# Patient Record
Sex: FEMALE | NOT HISPANIC OR LATINO
[De-identification: names, ages, dates, MRNs, and addresses within clinical notes are randomized per-mention and may not be internally consistent; named-entity substitution may affect disease eponyms.]

---

## 2018-03-12 ENCOUNTER — TRANSCRIPTION ENCOUNTER (OUTPATIENT)
Age: 23
End: 2018-03-12

## 2018-03-16 ENCOUNTER — TRANSCRIPTION ENCOUNTER (OUTPATIENT)
Age: 23
End: 2018-03-16

## 2018-05-08 ENCOUNTER — RESULT REVIEW (OUTPATIENT)
Age: 23
End: 2018-05-08

## 2018-12-17 PROBLEM — Z00.00 ENCOUNTER FOR PREVENTIVE HEALTH EXAMINATION: Status: ACTIVE | Noted: 2018-12-17

## 2018-12-18 ENCOUNTER — RECORD ABSTRACTING (OUTPATIENT)
Age: 23
End: 2018-12-18

## 2018-12-18 DIAGNOSIS — Z80.1 FAMILY HISTORY OF MALIGNANT NEOPLASM OF TRACHEA, BRONCHUS AND LUNG: ICD-10-CM

## 2018-12-18 DIAGNOSIS — Z82.62 FAMILY HISTORY OF OSTEOPOROSIS: ICD-10-CM

## 2018-12-18 DIAGNOSIS — D68.9 COAGULATION DEFECT, UNSPECIFIED: ICD-10-CM

## 2018-12-18 DIAGNOSIS — N94.6 DYSMENORRHEA, UNSPECIFIED: ICD-10-CM

## 2018-12-18 LAB — CYTOLOGY CVX/VAG DOC THIN PREP: NORMAL

## 2018-12-18 RX ORDER — INSULIN ASPART 100 [IU]/ML
100 INJECTION, SOLUTION INTRAVENOUS; SUBCUTANEOUS
Refills: 0 | Status: ACTIVE | COMMUNITY

## 2018-12-28 ENCOUNTER — OTHER (OUTPATIENT)
Age: 23
End: 2018-12-28

## 2019-01-02 ENCOUNTER — APPOINTMENT (OUTPATIENT)
Dept: OBGYN | Facility: CLINIC | Age: 24
End: 2019-01-02

## 2019-01-16 ENCOUNTER — APPOINTMENT (OUTPATIENT)
Dept: OBGYN | Facility: CLINIC | Age: 24
End: 2019-01-16
Payer: COMMERCIAL

## 2019-01-16 VITALS
WEIGHT: 133 LBS | BODY MASS INDEX: 25.11 KG/M2 | DIASTOLIC BLOOD PRESSURE: 64 MMHG | HEIGHT: 61 IN | SYSTOLIC BLOOD PRESSURE: 110 MMHG

## 2019-01-16 DIAGNOSIS — N83.201 UNSPECIFIED OVARIAN CYST, RIGHT SIDE: ICD-10-CM

## 2019-01-16 LAB
BILIRUB UR QL STRIP: NORMAL
COLLECTION METHOD: NORMAL
GLUCOSE UR-MCNC: NORMAL
HCG UR QL: 0.2 EU/DL
HCG UR QL: NEGATIVE
HGB UR QL STRIP.AUTO: ABNORMAL
KETONES UR-MCNC: ABNORMAL
LEUKOCYTE ESTERASE UR QL STRIP: NORMAL
NITRITE UR QL STRIP: NORMAL
PH UR STRIP: 7
PROT UR STRIP-MCNC: NORMAL
QUALITY CONTROL: YES
SP GR UR STRIP: 1.01

## 2019-01-16 PROCEDURE — 81025 URINE PREGNANCY TEST: CPT

## 2019-01-16 PROCEDURE — 76830 TRANSVAGINAL US NON-OB: CPT

## 2019-01-16 PROCEDURE — 99213 OFFICE O/P EST LOW 20 MIN: CPT | Mod: 25

## 2019-01-16 PROCEDURE — 81003 URINALYSIS AUTO W/O SCOPE: CPT | Mod: QW

## 2019-01-31 ENCOUNTER — TRANSCRIPTION ENCOUNTER (OUTPATIENT)
Age: 24
End: 2019-01-31

## 2019-03-13 ENCOUNTER — RX RENEWAL (OUTPATIENT)
Age: 24
End: 2019-03-13

## 2019-05-15 ENCOUNTER — APPOINTMENT (OUTPATIENT)
Dept: OBGYN | Facility: CLINIC | Age: 24
End: 2019-05-15
Payer: COMMERCIAL

## 2019-05-15 VITALS
WEIGHT: 132 LBS | HEIGHT: 61 IN | BODY MASS INDEX: 24.92 KG/M2 | SYSTOLIC BLOOD PRESSURE: 102 MMHG | DIASTOLIC BLOOD PRESSURE: 58 MMHG

## 2019-05-15 LAB
BILIRUB UR QL STRIP: NORMAL
CLARITY UR: CLEAR
COLLECTION METHOD: NORMAL
GLUCOSE UR-MCNC: NORMAL
HCG UR QL: 0.2 EU/DL
HCG UR QL: NEGATIVE
HGB UR QL STRIP.AUTO: NORMAL
KETONES UR-MCNC: NORMAL
LEUKOCYTE ESTERASE UR QL STRIP: ABNORMAL
NITRITE UR QL STRIP: NORMAL
PH UR STRIP: 7
PROT UR STRIP-MCNC: NORMAL
QUALITY CONTROL: YES
SP GR UR STRIP: 1.01

## 2019-05-15 PROCEDURE — 99213 OFFICE O/P EST LOW 20 MIN: CPT

## 2019-05-15 PROCEDURE — 81003 URINALYSIS AUTO W/O SCOPE: CPT | Mod: QW

## 2019-05-15 PROCEDURE — 81025 URINE PREGNANCY TEST: CPT

## 2019-05-15 RX ORDER — CLINDAMYCIN HYDROCHLORIDE 300 MG/1
300 CAPSULE ORAL
Qty: 21 | Refills: 0 | Status: DISCONTINUED | COMMUNITY
Start: 2019-01-15

## 2019-05-15 RX ORDER — CLOCORTOLONE PIVALATE 1 MG/G
0.1 CREAM TOPICAL
Qty: 90 | Refills: 0 | Status: DISCONTINUED | COMMUNITY
Start: 2019-03-19

## 2019-05-15 RX ORDER — EMOLLIENT COMBINATION NO.60
SPRAY GEL TOPICAL
Qty: 227 | Refills: 0 | Status: DISCONTINUED | COMMUNITY
Start: 2019-03-19

## 2019-05-15 RX ORDER — FLUCONAZOLE 150 MG/1
150 TABLET ORAL
Qty: 2 | Refills: 3 | Status: DISCONTINUED | COMMUNITY
Start: 2019-03-13 | End: 2019-05-15

## 2019-05-15 RX ORDER — METRONIDAZOLE 7.5 MG/G
0.75 GEL VAGINAL
Refills: 0 | Status: DISCONTINUED | COMMUNITY
End: 2019-05-15

## 2019-05-15 RX ORDER — CEFUROXIME AXETIL 500 MG/1
500 TABLET ORAL
Qty: 20 | Refills: 0 | Status: DISCONTINUED | COMMUNITY
Start: 2019-03-06

## 2019-05-15 RX ORDER — CEPHALEXIN 500 MG/1
500 CAPSULE ORAL
Qty: 21 | Refills: 0 | Status: DISCONTINUED | COMMUNITY
Start: 2019-01-14

## 2019-05-15 RX ORDER — LEVOTHYROXINE SODIUM 0.1 MG/1
100 TABLET ORAL
Refills: 0 | Status: DISCONTINUED | COMMUNITY
End: 2019-05-15

## 2019-05-15 RX ORDER — FLUTICASONE PROPIONATE 50 UG/1
50 SPRAY, METERED NASAL
Qty: 16 | Refills: 0 | Status: DISCONTINUED | COMMUNITY
Start: 2019-03-06

## 2019-05-15 RX ORDER — LEVOTHYROXINE SODIUM 0.09 MG/1
88 TABLET ORAL
Qty: 30 | Refills: 0 | Status: ACTIVE | COMMUNITY
Start: 2018-12-26

## 2019-05-15 RX ORDER — FLUCONAZOLE 150 MG/1
150 TABLET ORAL EVERY OTHER DAY
Qty: 60 | Refills: 0 | Status: DISCONTINUED | COMMUNITY
Start: 2019-05-15 | End: 2019-05-15

## 2019-05-16 ENCOUNTER — APPOINTMENT (OUTPATIENT)
Dept: OBGYN | Facility: CLINIC | Age: 24
End: 2019-05-16

## 2019-05-16 LAB
CANDIDA VAG CYTO: DETECTED
G VAGINALIS+PREV SP MTYP VAG QL MICRO: NOT DETECTED
T VAGINALIS VAG QL WET PREP: NOT DETECTED

## 2019-05-16 NOTE — HISTORY OF PRESENT ILLNESS
[Last Pap ___] : Last cervical pap smear was [unfilled] [Reproductive Age] : is of reproductive age [Definite:  ___ (Date)] : the last menstrual period was [unfilled] [Menarche Age: ____] : age at menarche was [unfilled] [Burning] : burning [] : the vaginal discharge is described as [Discharge] : discharge [Itching] : itching [Sexually Active] : is sexually active [White] : white [Contraception] : does not use contraception

## 2019-05-16 NOTE — PHYSICAL EXAM
[Labia Minora] : labia minora [Labia Majora] : labia major [Discharge] : a  ~M vaginal discharge was present [Normal] : clitoris [Scant] : scant [White] : white

## 2019-05-24 ENCOUNTER — LABORATORY RESULT (OUTPATIENT)
Age: 24
End: 2019-05-24

## 2019-05-24 ENCOUNTER — APPOINTMENT (OUTPATIENT)
Dept: OBGYN | Facility: CLINIC | Age: 24
End: 2019-05-24
Payer: COMMERCIAL

## 2019-05-24 VITALS
BODY MASS INDEX: 25.52 KG/M2 | SYSTOLIC BLOOD PRESSURE: 108 MMHG | WEIGHT: 130 LBS | DIASTOLIC BLOOD PRESSURE: 60 MMHG | HEIGHT: 60 IN

## 2019-05-24 DIAGNOSIS — Z83.3 FAMILY HISTORY OF DIABETES MELLITUS: ICD-10-CM

## 2019-05-24 DIAGNOSIS — Z78.9 OTHER SPECIFIED HEALTH STATUS: ICD-10-CM

## 2019-05-24 DIAGNOSIS — E06.3 AUTOIMMUNE THYROIDITIS: ICD-10-CM

## 2019-05-24 PROCEDURE — 81003 URINALYSIS AUTO W/O SCOPE: CPT | Mod: QW

## 2019-05-24 PROCEDURE — 81025 URINE PREGNANCY TEST: CPT

## 2019-05-24 PROCEDURE — 99395 PREV VISIT EST AGE 18-39: CPT

## 2019-05-24 NOTE — HISTORY OF PRESENT ILLNESS
[1 Year Ago] : 1 year ago [Good] : being in good health [Healthy Diet] : a healthy diet [Regular Exercise] : regular exercise [Last Pap ___] : Last cervical pap smear was [unfilled] [Reproductive Age] : is of reproductive age [Last Screen ___] : last STD screen was [unfilled] [Irregular Menses] : irregular menses [Pain] : pelvic pain [Definite:  ___ (Date)] : the last menstrual period was [unfilled] [Menarche Age: ____] : age at menarche was [unfilled] [Sexually Active] : is sexually active [Male ___] : [unfilled] male [No] : no [Weight Concerns] : no concerns with her weight [Contraception] : does not use contraception

## 2019-05-24 NOTE — COUNSELING
[Breast Self Exam] : breast self exam [Nutrition] : nutrition [Contraception] : contraception [Vaccines] : vaccines [Safe Sexual Practices] : safe sexual practices

## 2019-05-24 NOTE — PHYSICAL EXAM
[Awake] : awake [Alert] : alert [Soft] : soft [Labia Majora] : labia major [Labia Minora] : labia minora [Normal] : clitoris [Uterine Adnexae] : were not tender and not enlarged [Acute Distress] : no acute distress [LAD] : no lymphadenopathy [Goiter] : no goiter [Thyroid Nodule] : no thyroid nodule [Mass] : no breast mass [Nipple Discharge] : no nipple discharge [Tender] : non tender [Axillary LAD] : no axillary lymphadenopathy

## 2019-05-28 LAB
C TRACH RRNA SPEC QL NAA+PROBE: NOT DETECTED
N GONORRHOEA RRNA SPEC QL NAA+PROBE: NOT DETECTED
SOURCE AMPLIFICATION: NORMAL
SOURCE TP AMPLIFICATION: NORMAL
T VAGINALIS RRNA SPEC QL NAA+PROBE: NOT DETECTED

## 2019-06-05 LAB — CYTOLOGY CVX/VAG DOC THIN PREP: NORMAL

## 2019-06-09 ENCOUNTER — TRANSCRIPTION ENCOUNTER (OUTPATIENT)
Age: 24
End: 2019-06-09

## 2019-06-11 ENCOUNTER — APPOINTMENT (OUTPATIENT)
Dept: OBGYN | Facility: CLINIC | Age: 24
End: 2019-06-11
Payer: COMMERCIAL

## 2019-06-11 VITALS
DIASTOLIC BLOOD PRESSURE: 78 MMHG | BODY MASS INDEX: 25.52 KG/M2 | WEIGHT: 130 LBS | HEIGHT: 60 IN | SYSTOLIC BLOOD PRESSURE: 116 MMHG

## 2019-06-11 PROCEDURE — 81003 URINALYSIS AUTO W/O SCOPE: CPT | Mod: QW

## 2019-06-11 PROCEDURE — 81025 URINE PREGNANCY TEST: CPT

## 2019-06-11 PROCEDURE — 57454 BX/CURETT OF CERVIX W/SCOPE: CPT

## 2019-06-13 LAB
BILIRUB UR QL STRIP: NORMAL
CORE LAB BIOPSY: NORMAL
GLUCOSE UR-MCNC: NORMAL
HCG UR QL: 0.2 EU/DL
HCG UR QL: NEGATIVE
HGB UR QL STRIP.AUTO: NORMAL
KETONES UR-MCNC: NORMAL
LEUKOCYTE ESTERASE UR QL STRIP: NORMAL
NITRITE UR QL STRIP: NORMAL
PH UR STRIP: 6.5
PROT UR STRIP-MCNC: NORMAL
QUALITY CONTROL: YES
SP GR UR STRIP: 1

## 2019-06-18 NOTE — PROCEDURE
[Colposcopy] : colposcopy [ASCUS] : atypical squamous cells of undetermined significance [Patient] : patient [Risks] : risks [Benefits] : benefits [Alternatives] : alternatives [Consent Obtained] : written consent was obtained prior to the procedure [Normal Staining] : normal staining [No Abnormalities] : no abnormalities [Tolerated Well] : the patient tolerated the procedure well [ECC Done] : Endocervical curettage was performed.  [No Complications] : there were no complications

## 2020-10-13 ENCOUNTER — APPOINTMENT (OUTPATIENT)
Dept: OBGYN | Facility: CLINIC | Age: 25
End: 2020-10-13
Payer: COMMERCIAL

## 2020-10-13 VITALS
HEIGHT: 61 IN | DIASTOLIC BLOOD PRESSURE: 68 MMHG | SYSTOLIC BLOOD PRESSURE: 110 MMHG | BODY MASS INDEX: 25.68 KG/M2 | TEMPERATURE: 97.3 F | WEIGHT: 136 LBS

## 2020-10-13 DIAGNOSIS — E72.12 METHYLENETETRAHYDROFOLATE REDUCTASE DEFICIENCY: ICD-10-CM

## 2020-10-13 DIAGNOSIS — E10.9 TYPE 1 DIABETES MELLITUS W/OUT COMPLICATIONS: ICD-10-CM

## 2020-10-13 DIAGNOSIS — S03.00XA DISLOCATION OF JAW, UNSPECIFIED SIDE, INITIAL ENCOUNTER: ICD-10-CM

## 2020-10-13 DIAGNOSIS — Z11.3 ENCOUNTER FOR SCREENING FOR INFECTIONS WITH A PREDOMINANTLY SEXUAL MODE OF TRANSMISSION: ICD-10-CM

## 2020-10-13 DIAGNOSIS — Z01.419 ENCOUNTER FOR GYNECOLOGICAL EXAMINATION (GENERAL) (ROUTINE) W/OUT ABNORMAL FINDINGS: ICD-10-CM

## 2020-10-13 DIAGNOSIS — Z82.49 FAMILY HISTORY OF ISCHEMIC HEART DISEASE AND OTHER DISEASES OF THE CIRCULATORY SYSTEM: ICD-10-CM

## 2020-10-13 DIAGNOSIS — I82.629 ACUTE EMBOLISM AND THROMBOSIS OF DEEP VEINS OF UNSPECIFIED UPPER EXTREMITY: ICD-10-CM

## 2020-10-13 DIAGNOSIS — Z87.42 PERSONAL HISTORY OF OTHER DISEASES OF THE FEMALE GENITAL TRACT: ICD-10-CM

## 2020-10-13 DIAGNOSIS — Z78.9 OTHER SPECIFIED HEALTH STATUS: ICD-10-CM

## 2020-10-13 DIAGNOSIS — Z12.4 ENCOUNTER FOR SCREENING FOR MALIGNANT NEOPLASM OF CERVIX: ICD-10-CM

## 2020-10-13 DIAGNOSIS — R87.810 ATYPICAL SQUAMOUS CELLS OF UNDETERMINED SIGNIFICANCE ON CYTOLOGIC SMEAR OF CERVIX (ASC-US): ICD-10-CM

## 2020-10-13 DIAGNOSIS — R87.610 ATYPICAL SQUAMOUS CELLS OF UNDETERMINED SIGNIFICANCE ON CYTOLOGIC SMEAR OF CERVIX (ASC-US): ICD-10-CM

## 2020-10-13 PROCEDURE — 99395 PREV VISIT EST AGE 18-39: CPT

## 2020-10-13 NOTE — REVIEW OF SYSTEMS
spontaneous [Negative] : Heme/Lymph [Patient Intake Form Reviewed] : Patient intake form was reviewed

## 2020-10-16 LAB
C TRACH RRNA SPEC QL NAA+PROBE: NOT DETECTED
N GONORRHOEA RRNA SPEC QL NAA+PROBE: NOT DETECTED
SOURCE TP AMPLIFICATION: NORMAL

## 2020-10-16 NOTE — HISTORY OF PRESENT ILLNESS
[Y] : Patient is sexually active [N] : Patient denies prior pregnancies [Menarche Age: ____] : age at menarche was [unfilled] [Currently Active] : currently active [Men] : men [Monogamous (Male Partner)] : is monogamous with a male partner [No] : never pregnant [TextBox_31] : NEG [PapSmeardate] : 5/24/2019 [GonorrheaDate] : 05/24/2019 [TextBox_63] : NEG [ChlamydiaDate] : 05/24/2019 [LMPDate] : 9/20/2020 [TextBox_68] : NEG [PGHxTotal] : 0 [TextBox_6] : 09/20/2020 [TextBox_9] : 12 [FreeTextEntry1] : 9/20/2020

## 2020-10-16 NOTE — PLAN
[FreeTextEntry1] : F/U pap.\par \par Will need colposcopy if pap abnormal again.\par \par She desires pregnancy. She will need lovenox with pregnancy per her hematologist.\par \par She declines serum STD screening.\par \par She has excellent diabetes control.

## 2020-10-16 NOTE — PHYSICAL EXAM
[Alert] : alert [Appropriately responsive] : appropriately responsive [No Acute Distress] : no acute distress [Soft] : soft [Non-tender] : non-tender [Non-distended] : non-distended [Oriented x3] : oriented x3 [Examination Of The Breasts] : a normal appearance [No Masses] : no breast masses were palpable [Labia Majora] : normal [Labia Minora] : normal [Normal] : normal [Uterine Adnexae] : normal

## 2020-10-26 LAB — CYTOLOGY CVX/VAG DOC THIN PREP: NORMAL

## 2020-10-30 ENCOUNTER — TRANSCRIPTION ENCOUNTER (OUTPATIENT)
Age: 25
End: 2020-10-30

## 2020-10-30 LAB — HPV HIGH+LOW RISK DNA PNL CVX: DETECTED

## 2020-11-16 ENCOUNTER — TRANSCRIPTION ENCOUNTER (OUTPATIENT)
Age: 25
End: 2020-11-16

## 2020-11-16 ENCOUNTER — APPOINTMENT (OUTPATIENT)
Dept: ORTHOPEDIC SURGERY | Facility: CLINIC | Age: 25
End: 2020-11-16

## 2021-05-07 ENCOUNTER — NON-APPOINTMENT (OUTPATIENT)
Age: 26
End: 2021-05-07

## 2021-05-10 ENCOUNTER — NON-APPOINTMENT (OUTPATIENT)
Age: 26
End: 2021-05-10

## 2021-05-13 ENCOUNTER — APPOINTMENT (OUTPATIENT)
Dept: OBGYN | Facility: CLINIC | Age: 26
End: 2021-05-13
Payer: COMMERCIAL

## 2021-05-13 ENCOUNTER — RESULT CHARGE (OUTPATIENT)
Age: 26
End: 2021-05-13

## 2021-05-13 VITALS
WEIGHT: 141 LBS | HEIGHT: 61 IN | TEMPERATURE: 97.1 F | DIASTOLIC BLOOD PRESSURE: 70 MMHG | BODY MASS INDEX: 26.62 KG/M2 | SYSTOLIC BLOOD PRESSURE: 120 MMHG

## 2021-05-13 DIAGNOSIS — Z32.01 ENCOUNTER FOR PREGNANCY TEST, RESULT POSITIVE: ICD-10-CM

## 2021-05-13 LAB
BILIRUB UR QL STRIP: NORMAL
GLUCOSE UR-MCNC: NORMAL
HCG UR QL: 0.2 EU/DL
HCG UR QL: POSITIVE
HGB UR QL STRIP.AUTO: NORMAL
KETONES UR-MCNC: NORMAL
LEUKOCYTE ESTERASE UR QL STRIP: NORMAL
NITRITE UR QL STRIP: NORMAL
PH UR STRIP: 7
PROT UR STRIP-MCNC: NORMAL
QUALITY CONTROL: YES
SP GR UR STRIP: 1.01

## 2021-05-13 PROCEDURE — 81025 URINE PREGNANCY TEST: CPT

## 2021-05-13 PROCEDURE — 99072 ADDL SUPL MATRL&STAF TM PHE: CPT

## 2021-05-13 PROCEDURE — 81003 URINALYSIS AUTO W/O SCOPE: CPT | Mod: QW

## 2021-05-13 PROCEDURE — 99213 OFFICE O/P EST LOW 20 MIN: CPT

## 2021-05-13 NOTE — HISTORY OF PRESENT ILLNESS
[N] : Patient does not use contraception [Y] : Patient is sexually active [Menarche Age: ____] : age at menarche was [unfilled] [Currently Active] : currently active [Men] : men [Vaginal] : vaginal [No] : No [PapSmeardate] : 10/13/20 [TextBox_4] : Confirmation of pregnancy [TextBox_31] : nregative [LMPDate] : 3/23/21 [PGxTotal] : 1 [facility] [TextBox_29] : n/a [TextBox_36] : n/a [TextBox_6] : 3/23/21 [TextBox_28] : n/a [TextBox_34] : n/a [TextBox_18] : n/a [FreeTextEntry1] : 3/23/21

## 2021-05-13 NOTE — DISCUSSION/SUMMARY
[FreeTextEntry1] : Gc Chlam done\par \par Reviewed healthy eating and hydration,\par Advised pt to avoid raw seafood, deli meats and unpasteurized dairy products\par \par call parameters reviewed\par \par Pt advised to RTO in 1 week for New OB

## 2021-05-14 LAB
C TRACH RRNA SPEC QL NAA+PROBE: NOT DETECTED
N GONORRHOEA RRNA SPEC QL NAA+PROBE: NOT DETECTED
SOURCE AMPLIFICATION: NORMAL

## 2021-05-18 ENCOUNTER — NON-APPOINTMENT (OUTPATIENT)
Age: 26
End: 2021-05-18

## 2021-05-18 ENCOUNTER — APPOINTMENT (OUTPATIENT)
Dept: OBGYN | Facility: CLINIC | Age: 26
End: 2021-05-18
Payer: COMMERCIAL

## 2021-05-18 ENCOUNTER — ASOB RESULT (OUTPATIENT)
Age: 26
End: 2021-05-18

## 2021-05-18 VITALS
HEIGHT: 61 IN | BODY MASS INDEX: 26.81 KG/M2 | SYSTOLIC BLOOD PRESSURE: 112 MMHG | DIASTOLIC BLOOD PRESSURE: 70 MMHG | WEIGHT: 142 LBS

## 2021-05-18 LAB
BILIRUB UR QL STRIP: NORMAL
COLLECTION METHOD: NORMAL
GLUCOSE UR-MCNC: NORMAL
HCG UR QL: 0.2 EU/DL
HGB UR QL STRIP.AUTO: NORMAL
KETONES UR-MCNC: NORMAL
LEUKOCYTE ESTERASE UR QL STRIP: NORMAL
NITRITE UR QL STRIP: NORMAL
PH UR STRIP: 7
PROT UR STRIP-MCNC: NORMAL
SP GR UR STRIP: 1.1

## 2021-05-18 PROCEDURE — 76817 TRANSVAGINAL US OBSTETRIC: CPT

## 2021-05-18 PROCEDURE — 99214 OFFICE O/P EST MOD 30 MIN: CPT | Mod: 25

## 2021-05-18 PROCEDURE — 0500F INITIAL PRENATAL CARE VISIT: CPT

## 2021-05-18 PROCEDURE — 99072 ADDL SUPL MATRL&STAF TM PHE: CPT

## 2021-05-18 PROCEDURE — 36415 COLL VENOUS BLD VENIPUNCTURE: CPT

## 2021-05-20 ENCOUNTER — APPOINTMENT (OUTPATIENT)
Dept: OBGYN | Facility: CLINIC | Age: 26
End: 2021-05-20
Payer: COMMERCIAL

## 2021-05-20 ENCOUNTER — NON-APPOINTMENT (OUTPATIENT)
Age: 26
End: 2021-05-20

## 2021-05-20 VITALS
BODY MASS INDEX: 26.81 KG/M2 | HEIGHT: 61 IN | DIASTOLIC BLOOD PRESSURE: 60 MMHG | WEIGHT: 142 LBS | TEMPERATURE: 97.9 F | SYSTOLIC BLOOD PRESSURE: 110 MMHG

## 2021-05-20 DIAGNOSIS — Z34.90 ENCOUNTER FOR SUPERVISION OF NORMAL PREGNANCY, UNSPECIFIED, UNSPECIFIED TRIMESTER: ICD-10-CM

## 2021-05-20 DIAGNOSIS — B97.7 PAPILLOMAVIRUS AS THE CAUSE OF DISEASES CLASSIFIED ELSEWHERE: ICD-10-CM

## 2021-05-20 PROCEDURE — 99072 ADDL SUPL MATRL&STAF TM PHE: CPT

## 2021-05-20 PROCEDURE — 57454 BX/CURETT OF CERVIX W/SCOPE: CPT

## 2021-05-24 ENCOUNTER — NON-APPOINTMENT (OUTPATIENT)
Age: 26
End: 2021-05-24

## 2021-05-24 PROBLEM — Z34.90 CURRENTLY PREGNANT: Status: ACTIVE | Noted: 2021-05-17

## 2021-05-24 LAB
25(OH)D3 SERPL-MCNC: 39.4 NG/ML
ABO + RH PNL BLD: NORMAL
B19V IGG SER QL IA: 8.04 INDEX
B19V IGG+IGM SER-IMP: NORMAL
B19V IGG+IGM SER-IMP: POSITIVE
B19V IGM FLD-ACNC: 0.27 INDEX
B19V IGM SER-ACNC: NEGATIVE
BASOPHILS # BLD AUTO: 0.04 K/UL
BASOPHILS NFR BLD AUTO: 0.3 %
BLD GP AB SCN SERPL QL: NORMAL
CMV IGG SERPL QL: <0.2 U/ML
CMV IGG SERPL-IMP: NEGATIVE
CMV IGM SERPL QL: <8 AU/ML
CMV IGM SERPL QL: NEGATIVE
EOSINOPHIL # BLD AUTO: 0.16 K/UL
EOSINOPHIL NFR BLD AUTO: 1.3 %
ESTIMATED AVERAGE GLUCOSE: 126 MG/DL
HBA1C MFR BLD HPLC: 6 %
HBV SURFACE AG SER QL: NONREACTIVE
HCT VFR BLD CALC: 43.1 %
HGB A MFR BLD: 97.3 %
HGB A2 MFR BLD: 2.7 %
HGB BLD-MCNC: 13.7 G/DL
HGB FRACT BLD-IMP: NORMAL
HIV1+2 AB SPEC QL IA.RAPID: NONREACTIVE
IMM GRANULOCYTES NFR BLD AUTO: 0.5 %
LYMPHOCYTES # BLD AUTO: 2.55 K/UL
LYMPHOCYTES NFR BLD AUTO: 21 %
M TB IFN-G BLD-IMP: NEGATIVE
MAN DIFF?: NORMAL
MCHC RBC-ENTMCNC: 29.7 PG
MCHC RBC-ENTMCNC: 31.8 GM/DL
MCV RBC AUTO: 93.5 FL
MEV IGG FLD QL IA: 118 AU/ML
MEV IGG+IGM SER-IMP: POSITIVE
MONOCYTES # BLD AUTO: 0.68 K/UL
MONOCYTES NFR BLD AUTO: 5.6 %
NEUTROPHILS # BLD AUTO: 8.68 K/UL
NEUTROPHILS NFR BLD AUTO: 71.3 %
PLATELET # BLD AUTO: 372 K/UL
QUANTIFERON TB PLUS MITOGEN MINUS NIL: 9.75 IU/ML
QUANTIFERON TB PLUS NIL: 0.02 IU/ML
QUANTIFERON TB PLUS TB1 MINUS NIL: 0 IU/ML
QUANTIFERON TB PLUS TB2 MINUS NIL: -0.01 IU/ML
RBC # BLD: 4.61 M/UL
RBC # FLD: 13 %
RUBV IGG FLD-ACNC: 13.2 INDEX
RUBV IGG SER-IMP: POSITIVE
T GONDII AB SER-IMP: NEGATIVE
T GONDII IGM SER QL: <3 AU/ML
T PALLIDUM AB SER QL IA: NEGATIVE
TSH SERPL-ACNC: 2.64 UIU/ML
WBC # FLD AUTO: 12.17 K/UL

## 2021-05-24 NOTE — PROCEDURE
[Colposcopy] : Colposcopy  [Time out performed] : Pre-procedure time out performed.  Patient's name, date of birth and procedure confirmed. [Consent Obtained] : Consent obtained [Risks] : risks [Benefits] : benefits [HPV High Risk] : HPV high risk [Patient] : patient [Infection] : infection [Bleeding] : bleeding [Allergic Reaction] : allergic reaction [No Premedication] : no premedication [Colposcopy Adequate] : colposcopy adequate [ECC Performed] : ECC performed [Hemostasis Obtained] : Hemostasis obtained [Tolerated Well] : the patient tolerated the procedure well [Alternatives] : alternatives [No Abnormalities] : no abnormalities [Biopsy] : biopsy taken [de-identified] : 2 [de-identified] : Acetowhite area at 7 o'clock of cervix after application of Acetic Acid.\par Non-staining area at 7 o'clock of cervix after application of Lugol's solution.\par Some vascularity noted between 6-7 o'clock.\par  [de-identified] : 7 o'clock of cervix [de-identified] : 7 o'clock of cervix [de-identified] : with direct pressure and Monsel's solution [de-identified] : No ECC done due to pregnancy.

## 2021-05-24 NOTE — DISCUSSION/SUMMARY
[FreeTextEntry1] : -Colposcopy done today for h/o persistent HPV HR positive. Procedure details, r/b/a were discussed. Consent was signed. Please see procedure details above. Patient tolerated the procedure well without any complications. FHR prior to colposcopy by sono: 146 bpm -> FHR after procedure by sono: 143 bpm.\par \par -Pelvic rest x 2 weeks were discussed. Post procedure expectations and call parameters were reviewed. \par \par -Patient advised to call in 2 weeks to review results.\par \par -Follow up in 4 weeks for NT sono and first trimester screen. She was also previously referred to [facility] for consultation and will call soon to schedule an appointment.\par

## 2021-05-24 NOTE — HISTORY OF PRESENT ILLNESS
[Patient reported PAP Smear was normal] : Patient reported PAP Smear was normal [HPV test offered] : HPV test offered [Menarche Age: ____] : age at menarche was [unfilled] [N] : Patient does not use contraception [Monogamous (Male Partner)] : is monogamous with a male partner [Y] : Positive pregnancy history [Currently Active] : currently active [Men] : men [Vaginal] : vaginal [No] : No [Patient refuses STI testing] : Patient refuses STI testing [TextBox_4] : PATIENT PRESENTS FOR COLPOSCOPY. [PapSmeardate] : 10/13/20 [TextBox_31] : NEG [HPVDate] : 10/13/20 [TextBox_78] : POSITIVE [LMPDate] : 03/23/21 [PGxTotal] : 1 [[facility] [FreeTextEntry1] : 03/23/21

## 2021-06-04 ENCOUNTER — NON-APPOINTMENT (OUTPATIENT)
Age: 26
End: 2021-06-04

## 2021-06-08 ENCOUNTER — TRANSCRIPTION ENCOUNTER (OUTPATIENT)
Age: 26
End: 2021-06-08

## 2021-06-10 ENCOUNTER — NON-APPOINTMENT (OUTPATIENT)
Age: 26
End: 2021-06-10

## 2021-06-14 ENCOUNTER — NON-APPOINTMENT (OUTPATIENT)
Age: 26
End: 2021-06-14

## 2021-06-15 ENCOUNTER — APPOINTMENT (OUTPATIENT)
Dept: OBGYN | Facility: CLINIC | Age: 26
End: 2021-06-15

## 2021-06-20 LAB
AR GENE MUT ANL BLD/T: NORMAL
CFTR MUT TESTED BLD/T: NEGATIVE
FMR1 GENE MUT ANL BLD/T: NORMAL

## 2021-06-23 ENCOUNTER — APPOINTMENT (OUTPATIENT)
Dept: ANTEPARTUM | Facility: CLINIC | Age: 26
End: 2021-06-23
Payer: COMMERCIAL

## 2021-06-23 ENCOUNTER — ASOB RESULT (OUTPATIENT)
Age: 26
End: 2021-06-23

## 2021-06-23 ENCOUNTER — APPOINTMENT (OUTPATIENT)
Dept: MATERNAL FETAL MEDICINE | Facility: CLINIC | Age: 26
End: 2021-06-23
Payer: COMMERCIAL

## 2021-06-23 VITALS
OXYGEN SATURATION: 99 % | WEIGHT: 143 LBS | BODY MASS INDEX: 27 KG/M2 | HEIGHT: 61 IN | HEART RATE: 83 BPM | DIASTOLIC BLOOD PRESSURE: 60 MMHG | SYSTOLIC BLOOD PRESSURE: 108 MMHG

## 2021-06-23 PROCEDURE — 76813 OB US NUCHAL MEAS 1 GEST: CPT | Mod: 59

## 2021-06-23 PROCEDURE — 99204 OFFICE O/P NEW MOD 45 MIN: CPT | Mod: 25

## 2021-06-23 PROCEDURE — 99072 ADDL SUPL MATRL&STAF TM PHE: CPT

## 2021-06-23 PROCEDURE — 36416 COLLJ CAPILLARY BLOOD SPEC: CPT

## 2021-06-23 PROCEDURE — 76801 OB US < 14 WKS SINGLE FETUS: CPT

## 2021-06-30 LAB
CLARI ADDITIONAL INFO: NORMAL
CLARI CHROMOSOME 13: NORMAL
CLARI CHROMOSOME 18: NORMAL
CLARI CHROMOSOME 21: NORMAL
CLARI SEX CHROMOSOMES: NORMAL
CLARITEST NIPT: NORMAL

## 2022-03-13 ENCOUNTER — TRANSCRIPTION ENCOUNTER (OUTPATIENT)
Age: 27
End: 2022-03-13

## 2022-05-27 ENCOUNTER — OUTPATIENT (OUTPATIENT)
Dept: OUTPATIENT SERVICES | Facility: HOSPITAL | Age: 27
LOS: 1 days | End: 2022-05-27

## 2022-05-27 DIAGNOSIS — Z20.828 CONTACT WITH AND (SUSPECTED) EXPOSURE TO OTHER VIRAL COMMUNICABLE DISEASES: ICD-10-CM

## 2022-06-05 ENCOUNTER — NON-APPOINTMENT (OUTPATIENT)
Age: 27
End: 2022-06-05

## 2022-07-12 LAB — CORE LAB BIOPSY: NORMAL

## 2023-07-06 ENCOUNTER — OFFICE (OUTPATIENT)
Dept: URBAN - METROPOLITAN AREA CLINIC 12 | Facility: CLINIC | Age: 28
Setting detail: OPHTHALMOLOGY
End: 2023-07-06
Payer: COMMERCIAL

## 2023-07-06 DIAGNOSIS — E10.9: ICD-10-CM

## 2023-07-06 DIAGNOSIS — H43.393: ICD-10-CM

## 2023-07-06 PROCEDURE — 92014 COMPRE OPH EXAM EST PT 1/>: CPT | Performed by: OPHTHALMOLOGY

## 2023-07-06 ASSESSMENT — VISUAL ACUITY
OD_BCVA: 20/20-1
OS_BCVA: 20/20-1

## 2023-07-06 ASSESSMENT — TONOMETRY
OS_IOP_MMHG: 15
OD_IOP_MMHG: 16

## 2023-07-06 ASSESSMENT — CONFRONTATIONAL VISUAL FIELD TEST (CVF)
OD_FINDINGS: FULL
OS_FINDINGS: FULL

## 2023-07-06 ASSESSMENT — REFRACTION_AUTOREFRACTION
OS_AXIS: 174
OD_AXIS: 7
OD_SPHERE: +0.50
OD_CYLINDER: -0.25
OS_SPHERE: +0.25
OS_CYLINDER: -0.50

## 2023-07-06 ASSESSMENT — SPHEQUIV_DERIVED
OD_SPHEQUIV: 0.375
OS_SPHEQUIV: 0

## 2023-07-06 ASSESSMENT — KERATOMETRY
OS_K2POWER_DIOPTERS: 44.25
OD_K1POWER_DIOPTERS: 43.50
OS_K1POWER_DIOPTERS: 43.25
OD_K2POWER_DIOPTERS: 44.50
OD_AXISANGLE_DEGREES: 90
OS_AXISANGLE_DEGREES: 87

## 2023-07-06 ASSESSMENT — AXIALLENGTH_DERIVED
OD_AL: 23.2667
OS_AL: 23.5004

## 2024-08-08 ENCOUNTER — OFFICE (OUTPATIENT)
Dept: URBAN - METROPOLITAN AREA CLINIC 12 | Facility: CLINIC | Age: 29
Setting detail: OPHTHALMOLOGY
End: 2024-08-08
Payer: COMMERCIAL

## 2024-08-08 DIAGNOSIS — H43.393: ICD-10-CM

## 2024-08-08 DIAGNOSIS — E10.9: ICD-10-CM

## 2024-08-08 PROCEDURE — 92014 COMPRE OPH EXAM EST PT 1/>: CPT | Performed by: OPHTHALMOLOGY

## 2024-08-08 ASSESSMENT — CONFRONTATIONAL VISUAL FIELD TEST (CVF)
OD_FINDINGS: FULL
OS_FINDINGS: FULL